# Patient Record
(demographics unavailable — no encounter records)

---

## 2025-05-20 NOTE — BEGINNING OF VISIT
[] :  [Language [employer] ] : provided by [employer]   [Patient] : patient [Time Spent: ____ minutes] : Total time spent using  services: [unfilled] minutes. The patient's primary language is not English thus required  services. [Medical Records] : medical records [Mother] : mother [Interpreters_IDNumber] : [id number] [Interpreters_FullName] : Alicia [TWNoteComboBox1] : [place]

## 2025-05-20 NOTE — PHYSICAL EXAM
[NL] : no acute distress, alert [de-identified] : jacked laceration at right knee repaired with black sutures, no redness or discharge, cleansed with Betadine, dressed with Telfa and ace wrap, imobilizer returned

## 2025-05-20 NOTE — HISTORY OF PRESENT ILLNESS
[de-identified] : laceration [FreeTextEntry6] : Caridad was climbing on something in [employer] 2 days ago and fell cutting his right knee. He was seen at [facility] and sutured. No fracture or leg pain. He is in a boot so he will not bend the knee. He has not been back to school yet. He was instructed to have the laceration checked today.

## 2025-05-20 NOTE — DISCUSSION/SUMMARY
[FreeTextEntry1] : We discussed symptomatic treatment of injury, ice, elevate, Ibuprofen prn. Wound care daily with Bacitracin and Telfa. No bending. No gym. Has apt to remove sutures in 2 weeks at [facility].